# Patient Record
Sex: FEMALE | Race: WHITE
[De-identification: names, ages, dates, MRNs, and addresses within clinical notes are randomized per-mention and may not be internally consistent; named-entity substitution may affect disease eponyms.]

---

## 2019-11-24 ENCOUNTER — HOSPITAL ENCOUNTER (EMERGENCY)
Dept: HOSPITAL 62 - ER | Age: 39
Discharge: HOME | End: 2019-11-24
Payer: COMMERCIAL

## 2019-11-24 VITALS — SYSTOLIC BLOOD PRESSURE: 121 MMHG | DIASTOLIC BLOOD PRESSURE: 87 MMHG

## 2019-11-24 DIAGNOSIS — R07.9: ICD-10-CM

## 2019-11-24 DIAGNOSIS — R00.2: Primary | ICD-10-CM

## 2019-11-24 DIAGNOSIS — K21.9: ICD-10-CM

## 2019-11-24 LAB
ADD MANUAL DIFF: NO
ALBUMIN SERPL-MCNC: 4.5 G/DL (ref 3.5–5)
ALP SERPL-CCNC: 66 U/L (ref 38–126)
ANION GAP SERPL CALC-SCNC: 13 MMOL/L (ref 5–19)
APPEARANCE UR: (no result)
APTT PPP: YELLOW S
AST SERPL-CCNC: 23 U/L (ref 14–36)
BASOPHILS # BLD AUTO: 0 10^3/UL (ref 0–0.2)
BASOPHILS NFR BLD AUTO: 0.5 % (ref 0–2)
BILIRUB DIRECT SERPL-MCNC: 0.1 MG/DL (ref 0–0.4)
BILIRUB SERPL-MCNC: 0.9 MG/DL (ref 0.2–1.3)
BILIRUB UR QL STRIP: NEGATIVE
BUN SERPL-MCNC: 12 MG/DL (ref 7–20)
CALCIUM: 10.4 MG/DL (ref 8.4–10.2)
CHLORIDE SERPL-SCNC: 105 MMOL/L (ref 98–107)
CO2 SERPL-SCNC: 26 MMOL/L (ref 22–30)
EOSINOPHIL # BLD AUTO: 0 10^3/UL (ref 0–0.6)
EOSINOPHIL NFR BLD AUTO: 0.7 % (ref 0–6)
ERYTHROCYTE [DISTWIDTH] IN BLOOD BY AUTOMATED COUNT: 13.8 % (ref 11.5–14)
GLUCOSE SERPL-MCNC: 110 MG/DL (ref 75–110)
GLUCOSE UR STRIP-MCNC: NEGATIVE MG/DL
HCT VFR BLD CALC: 41.6 % (ref 36–47)
HGB BLD-MCNC: 14.3 G/DL (ref 12–15.5)
KETONES UR STRIP-MCNC: NEGATIVE MG/DL
LYMPHOCYTES # BLD AUTO: 1.6 10^3/UL (ref 0.5–4.7)
LYMPHOCYTES NFR BLD AUTO: 25.2 % (ref 13–45)
MCH RBC QN AUTO: 29.5 PG (ref 27–33.4)
MCHC RBC AUTO-ENTMCNC: 34.3 G/DL (ref 32–36)
MCV RBC AUTO: 86 FL (ref 80–97)
MONOCYTES # BLD AUTO: 0.5 10^3/UL (ref 0.1–1.4)
MONOCYTES NFR BLD AUTO: 7.4 % (ref 3–13)
NEUTROPHILS # BLD AUTO: 4.2 10^3/UL (ref 1.7–8.2)
NEUTS SEG NFR BLD AUTO: 66.2 % (ref 42–78)
NT PRO BNP: 31 PG/ML (ref ?–125)
PH UR STRIP: 6 [PH] (ref 5–9)
PLATELET # BLD: 301 10^3/UL (ref 150–450)
POTASSIUM SERPL-SCNC: 4.1 MMOL/L (ref 3.6–5)
PROT SERPL-MCNC: 7.6 G/DL (ref 6.3–8.2)
PROT UR STRIP-MCNC: 30 MG/DL
RBC # BLD AUTO: 4.85 10^6/UL (ref 3.72–5.28)
SP GR UR STRIP: 1.02
TOTAL CELLS COUNTED % (AUTO): 100 %
TROPONIN I SERPL-MCNC: < 0.012 NG/ML
UROBILINOGEN UR-MCNC: NEGATIVE MG/DL (ref ?–2)
WBC # BLD AUTO: 6.4 10^3/UL (ref 4–10.5)

## 2019-11-24 PROCEDURE — 93010 ELECTROCARDIOGRAM REPORT: CPT

## 2019-11-24 PROCEDURE — 83735 ASSAY OF MAGNESIUM: CPT

## 2019-11-24 PROCEDURE — 81001 URINALYSIS AUTO W/SCOPE: CPT

## 2019-11-24 PROCEDURE — 83880 ASSAY OF NATRIURETIC PEPTIDE: CPT

## 2019-11-24 PROCEDURE — 93005 ELECTROCARDIOGRAM TRACING: CPT

## 2019-11-24 PROCEDURE — 71046 X-RAY EXAM CHEST 2 VIEWS: CPT

## 2019-11-24 PROCEDURE — 84443 ASSAY THYROID STIM HORMONE: CPT

## 2019-11-24 PROCEDURE — 84484 ASSAY OF TROPONIN QUANT: CPT

## 2019-11-24 PROCEDURE — 99285 EMERGENCY DEPT VISIT HI MDM: CPT

## 2019-11-24 PROCEDURE — 85025 COMPLETE CBC W/AUTO DIFF WBC: CPT

## 2019-11-24 PROCEDURE — 36415 COLL VENOUS BLD VENIPUNCTURE: CPT

## 2019-11-24 PROCEDURE — 80053 COMPREHEN METABOLIC PANEL: CPT

## 2019-11-24 PROCEDURE — 84703 CHORIONIC GONADOTROPIN ASSAY: CPT

## 2019-11-24 PROCEDURE — 83690 ASSAY OF LIPASE: CPT

## 2019-11-24 NOTE — ER DOCUMENT REPORT
ED General





- General


Chief Complaint: Chest Pain


Stated Complaint: CHEST PAIN


Time Seen by Provider: 11/24/19 13:28


Mode of Arrival: Ambulatory


TRAVEL OUTSIDE OF THE [place] IN LAST 30 DAYS: No





- HPI


Notes: 





39-year-old female to the emergency department with complaints of epigastric 

abdominal pain that began about a week ago that then progressed to chest 

discomfort and exertional shortness of breath in the past 24 hours.  She states 

that she just drove from [place] to here which is a trip she normally takes.  

She denies any leg swelling or history of DVT.  She is not on oral c

ontraceptives.  She states that she was driving she began to have "incomplete 

yawns" she states that when she got up for a break in the car she noticed that 

she was having little bit more difficulty walking.  She states that she felt 

like she was a little short of breath and she would start to feel little bit of 

chest pain.  She states that with her apple watch she noticed that her heart 

rate went up to 157.  She denies any diaphoresis, nausea or vomiting.  She 

states that she has had a fast heart rate in the past and has worn a Holter 

monitor for 24 to 48 hours but nothing was found.  She denies any past medical 

history for thyroid dysfunction.  She does admit that she has had some low pota

ssium before but it has not been an issue in several years.  She does not smoke,

she is not diabetic, she is not hypertensive, and she does not have known 

hyperlipidemia.  She states that she attempted to walk around the store today 

with her family and really was struggling with that and so decided to seek 

medical attention.  Does report that she attempted to go to urgent care first 

and they did an EKG on her which showed that her heart rate was fast.  She 

states that they sent her here for further evaluation.





- Related Data


Allergies/Adverse Reactions: 


                                        





mold Allergy (Verified 11/24/19 13:29)


   


pollen extracts Allergy (Verified 11/24/19 13:29)


   











Past Medical History





- General


Information source: Patient





- Social History


Smoking Status: Never Smoker


Chew tobacco use (# tins/day): No


Frequency of alcohol use: None


Drug Abuse: None


Family History: Reviewed & Not Pertinent


Patient has suicidal ideation: No


Patient has homicidal ideation: No





- Past Medical History


Cardiac Medical History: Reports: Hx Hypercholesterolemia


Endocrine Medical History: Reports: Hx Diabetes Mellitus Type 2 - Prediabetic


Renal/ Medical History: Denies: Hx Peritoneal Dialysis





Review of Systems





- Review of Systems


Constitutional: denies: Chills, Diaphoresis, Fever


EENT: No symptoms reported


Cardiovascular: Chest pain, Palpitations, Heart racing, Dyspnea.  denies: 

Syncope, Dizziness, Lightheaded


Respiratory: Short of breath.  denies: Cough


Gastrointestinal: denies: Abdominal pain, Diarrhea, Nausea, Vomiting


Genitourinary: No symptoms reported


Musculoskeletal: No symptoms reported


Hematologic/Lymphatic: No symptoms reported


Neurological/Psychological: No symptoms reported


-: Yes All other systems reviewed and negative





Physical Exam





- Vital signs


Vitals: 


                                        











Temp Pulse Resp BP Pulse Ox


 


 98 F   94   16   137/85 H  99 


 


 11/24/19 13:29  11/24/19 13:29  11/24/19 13:29  11/24/19 13:29 11/24/19 13:29











Interpretation: Normal





- General


General appearance: Appears well, Alert


In distress: None





- HEENT


Head: Normocephalic, Atraumatic


Eyes: Normal


Pupils: PERRL


Ears: Normal


External canal: Normal


Tympanic membrane: Normal


Sinus: Normal


Nasal: Normal


Mouth/Lips: Normal


Mucous membranes: Normal


Pharynx: Normal.  No: Potential airway comprom.


Neck: Normal, Supple.  No: Lymphadenopathy, Meningismus





- Respiratory


Respiratory status: No respiratory distress.  No: Labored, Pursed lip breathing,

Retractions, Tachypnea, Tripod position


Chest status: Nontender.  No: Accessory muscle use


Breath sounds: Normal.  No: Rales, Rhonchi, Wheezing


Chest palpation: Normal





- Cardiovascular


Rhythm: Regular


Heart sounds: Normal auscultation


Murmur: No





- Abdominal


Inspection: Normal


Distension: No distension


Bowel sounds: Normal


Tenderness: Nontender.  No: Tender, McBurney's point, Friend's sign, Guarding, 

Rebound


Organomegaly: No organomegaly





- Back


Back: Normal, Nontender.  No: Deformity/step-off, CVA tenderness, Vertebra 

tenderness





- Neurological


Neuro grossly intact: Yes


Cognition: Normal


Orientation: AAOx4


Eddi Coma Scale Eye Opening: Spontaneous


Eddi Coma Scale Verbal: Oriented


[place] Coma Scale Motor: Obeys Commands


Eddi Coma Scale Total: 15


Speech: Normal


Cranial nerves: Normal.  No: Facial palsy, Forehead sparing


Cerebellar coordination: Normal.  No: Gait ataxia


Motor strength normal: LUE, RUE, LLE, RLE


Additional motor exam normals: Equal .  No: Pronator drift


Sensory: Normal





- Psychological


Associated symptoms: Normal affect, Normal mood





- Skin


Skin Temperature: Warm


Skin Moisture: Dry


Skin Color: Normal





Course





- Re-evaluation


Re-evalutation: 





11/24/19 





Laboratory











  11/24/19 11/24/19 11/24/19





  13:37 13:37 13:37


 


WBC  6.4  


 


RBC  4.85  


 


Hgb  14.3  


 


Hct  41.6  


 


MCV  86  


 


MCH  29.5  


 


MCHC  34.3  


 


RDW  13.8  


 


Plt Count  301  


 


Lymph % (Auto)  25.2  


 


Mono % (Auto)  7.4  


 


Eos % (Auto)  0.7  


 


Baso % (Auto)  0.5  


 


Absolute Neuts (auto)  4.2  


 


Absolute Lymphs (auto)  1.6  


 


Absolute Monos (auto)  0.5  


 


Absolute Eos (auto)  0.0  


 


Absolute Basos (auto)  0.0  


 


Seg Neutrophils %  66.2  


 


Sodium   143.9 


 


Potassium   4.1 


 


Chloride   105 


 


Carbon Dioxide   26 


 


Anion Gap   13 


 


BUN   12 


 


Creatinine   1.00 


 


Est GFR ( Amer)   > 60 


 


Est GFR (MDRD) Non-Af   > 60 


 


Glucose   110 


 


Calcium   10.4 H 


 


Magnesium   1.9 


 


Total Bilirubin   0.9 


 


Direct Bilirubin   0.1 


 


Neonat Total Bilirubin   Not Reportable 


 


Neonat Direct Bilirubin   Not Reportable 


 


Neonat Indirect Bili   Not Reportable 


 


AST   23 


 


ALT   18 


 


Alkaline Phosphatase   66 


 


Troponin I    < 0.012


 


NT-Pro-B Natriuret Pep    31


 


Total Protein   7.6 


 


Albumin   4.5 


 


Lipase   123.9 


 


Serum HCG, Qual   


 


Urine Color   


 


Urine Appearance   


 


Urine pH   


 


Ur Specific Gravity   


 


Urine Protein   


 


Urine Glucose (UA)   


 


Urine Ketones   


 


Urine Blood   


 


Urine Nitrite (Reflex)   


 


Urine Bilirubin   


 


Urine Urobilinogen   


 


Leukocyte Esterase Rfl   


 


Urine RBC (Auto)   


 


Urine Bacteria (Auto)   


 


Urine WBC (Reflex)   


 


Squamous Epi Cells Auto   


 


Urine Mucus (Auto)   


 


Urine Ascorbic Acid   














  11/24/19 11/24/19





  13:37 13:37


 


WBC  


 


RBC  


 


Hgb  


 


Hct  


 


MCV  


 


MCH  


 


MCHC  


 


RDW  


 


Plt Count  


 


Lymph % (Auto)  


 


Mono % (Auto)  


 


Eos % (Auto)  


 


Baso % (Auto)  


 


Absolute Neuts (auto)  


 


Absolute Lymphs (auto)  


 


Absolute Monos (auto)  


 


Absolute Eos (auto)  


 


Absolute Basos (auto)  


 


Seg Neutrophils %  


 


Sodium  


 


Potassium  


 


Chloride  


 


Carbon Dioxide  


 


Anion Gap  


 


BUN  


 


Creatinine  


 


Est GFR ( Amer)  


 


Est GFR (MDRD) Non-Af  


 


Glucose  


 


Calcium  


 


Magnesium  


 


Total Bilirubin  


 


Direct Bilirubin  


 


Neonat Total Bilirubin  


 


Neonat Direct Bilirubin  


 


Neonat Indirect Bili  


 


AST  


 


ALT  


 


Alkaline Phosphatase  


 


Troponin I  


 


NT-Pro-B Natriuret Pep  


 


Total Protein  


 


Albumin  


 


Lipase  


 


Serum HCG, Qual  NEGATIVE 


 


Urine Color   YELLOW


 


Urine Appearance   CLOUDY


 


Urine pH   6.0


 


Ur Specific Gravity   1.025


 


Urine Protein   30 H


 


Urine Glucose (UA)   NEGATIVE


 


Urine Ketones   NEGATIVE


 


Urine Blood   NEGATIVE


 


Urine Nitrite (Reflex)   NEGATIVE


 


Urine Bilirubin   NEGATIVE


 


Urine Urobilinogen   NEGATIVE


 


Leukocyte Esterase Rfl   TRACE H


 


Urine RBC (Auto)   4


 


Urine Bacteria (Auto)   1+


 


Urine WBC (Reflex)   5


 


Squamous Epi Cells Auto   7


 


Urine Mucus (Auto)   MANY


 


Urine Ascorbic Acid   NEGATIVE











                                        





Chest X-Ray  11/24/19 13:33


IMPRESSION:  No acute abnormality of the lungs.  No focal airspace opacity.


 














11/24/19 


Patient has done well in the emergency department today.  She had relief of her 

symptoms particularly with the midsternal chest pain and upper abdominal pain 

with GI cocktail.  She states that she feels much better.  She is able to 

ambulate in the emergency department without any symptoms of exertional dyspnea.

 She has had very stable vital signs since arrival.  She has not been 

tachycardic for some time.  She has 2 negative trending troponins.  Will send 

home with [facility] for the reflux and then have her follow-up with her primary 

care physician and [place] for further evaluation for palpitations.  She agrees 

with the plan.  Discharge home.








- Vital Signs


Vital signs: 


                                        











Temp Pulse Resp BP Pulse Ox


 


 98 F   94   15   110/78   97 


 


 11/24/19 13:29  11/24/19 13:29  11/24/19 17:01  11/24/19 17:01  11/24/19 14:11














- Laboratory


Result Diagrams: 


                                 11/24/19 13:37





                                 11/24/19 13:37


Laboratory results interpreted by me: 


                                        











  11/24/19 11/24/19





  13:37 13:37


 


Calcium  10.4 H 


 


Urine Protein   30 H


 


Leukocyte Esterase Rfl   TRACE H














- Diagnostic Test


Radiology reviewed: Image reviewed, Reports reviewed





- EKG Interpretation by Me


EKG shows normal: Sinus rhythm


Rate: Normal


Rhythm: NSR


When compared to previous EKG there are: No significant change


Additional EKG results interpreted by me: 





11/24/19


No STEMI, no ST changes, normal axis, no LVH.  This is not significantly 

different from prior on April 30, 2019





Discharge





- Discharge


Clinical Impression: 


 Palpitation, Chest pain, GERD (gastroesophageal reflux disease)





Condition: Stable


Disposition: HOME, SELF-CARE


Instructions:  Palpitations (Irregular or Rapid Heartrate) (Critical access hospital), Reflux Disease

(GERD) (Critical access hospital)


Additional Instructions: 


FOLLOW UP WITH PRIMARY CARE AT [place] AND FOLLOW UP WITH CARDIOLOGY THERE.  RETURN

IF WORSE.  PUSH FLUIDS.  REST.  TAKE CARAFATE FOR REFLUX. 


Prescriptions: 


Sucralfate [Carafate] 1 gm PO QID #420 oral.susp

## 2019-11-24 NOTE — RADIOLOGY REPORT (SQ)
EXAM DESCRIPTION:  CHEST 2 VIEWS



COMPLETED DATE/TIME:  11/24/2019 1:58 pm



REASON FOR STUDY:  chest discomfort



COMPARISON:  4/30/2019



EXAM PARAMETERS:  NUMBER OF VIEWS: two views

TECHNIQUE: Digital Frontal and Lateral radiographic views of the chest acquired.

RADIATION DOSE: NA

LIMITATIONS: none



FINDINGS:  LUNGS AND PLEURA: No opacities, masses or pneumothorax. No pleural effusion.

MEDIASTINUM AND HILAR STRUCTURES: No masses or contour abnormalities.

HEART AND VASCULAR STRUCTURES: Heart normal size.  No evidence for failure.

BONES: No acute findings.

HARDWARE: None in the chest.

OTHER: No other significant finding.



IMPRESSION:  No acute abnormality of the lungs.  No focal airspace opacity.



TECHNICAL DOCUMENTATION:  JOB ID:  [id number]

 2011 [employer]- All Rights Reserved



Reading location - IP/workstation name: [email or web address]